# Patient Record
(demographics unavailable — no encounter records)

---

## 2025-05-20 NOTE — PROCEDURE
[Straight Catheterization] : insertion of a straight catheter [Urinary Tract Infection] : a urinary tract infection [Urgent Incontinence] : urgent incontinence [Patient] : the patient [___ Fr Straight Tip] : a [unfilled] in Polish straight tip catheter [None] : there were no complications with the catheter insertion [Clear] : clear [No Complications] : no complications [Tolerated Well] : the patient tolerated the procedure well [Post procedure instructions and information given] : Post procedure instructions and information were given and reviewed with patient. [1] : 1 [FreeTextEntry1] : cathed to obtain pvr and uncontam specimen

## 2025-05-20 NOTE — PHYSICAL EXAM
[MA] : MA [FreeTextEntry2] : Maryann [No Acute Distress] : in no acute distress [Oriented x3] : oriented to person, place, and time [Tenderness] : ~T no ~M abdominal tenderness observed [Distended] : not distended [Soft, Nontender] : the abdomen was soft and nontender [None] : no CVA tenderness [Labia Majora] : were normal [Labia Minora] : were normal [Bartholin's Gland] : both Bartholin's glands were normal  [Normal Appearance] : general appearance was normal [No Bleeding] : there was no active vaginal bleeding [2] : 2 [Aa ____] : Aa [unfilled] [Ba ____] : Ba [unfilled] [C ____] : C [unfilled] [GH ____] : GH [unfilled] [PB ____] : PB [unfilled] [TVL ____] : TVL  [unfilled] [Ap ____] : Ap [unfilled] [Bp ____] : Bp [unfilled] [D ____] : D [unfilled] [] : 0 [Normal] : normal [Soft] :  the cervix was soft [Post Void Residual ____ml] : post void residual was [unfilled] ml [Exam Deferred] : was deferred [FreeTextEntry4] : no mass, cyst, lesion [de-identified] : no appreciable mass, nontender

## 2025-05-20 NOTE — ASSESSMENT
[FreeTextEntry1] : 58 yo  P2 with 6 months of reported rUTIs diagnosed and treated at [facility]; only 1 UCx in [facility] from 2/25 was neg. On exam, her PVR was normal, empty supine CST was neg, there was no POP on POPQ, and atrophic vaginal wall changes were noted. We discussed the diagnosis of a UTI vs recurrent UTIs with symptoms as well as +UCx. We discussed providing a urine specimen to be tested with UTI suspected, possibly via cath to avoid contamination. We discussed potential evaluation with renal tract imaging and cystourethroscopy to eval for path such as stone or malignancy. We dissused prevention strategies including daily vs post-coital low-dose abx, topical vaginal estrogen if postmenopausal, acidifying urine with cranberry or vit C, methenamine, probiotic use, d-mannose. Risks, benefits; side effects of the meds discussed. Vaginal estrogen side effects such as breast tenderness or vaginal spotting, rare risks such as MI / stroke / VTE / estrogen-dependent cancer discussed and she would like to try it. Rx sent.  Plan: [] CTU [] RTO for cystourethroscopy [] start vaginal estrogen for UTI prophylaxis

## 2025-05-20 NOTE — OB HISTORY
[Vaginal ___] : [unfilled] vaginal delivery(s) [Approximately ___ (Month)] : the LMP was approximately [unfilled] month(s) ago [Last Pap Smear ___] : date of last pap smear was on [unfilled] [Sexually Active] : sexually active [Abnormal Pap Smear] : normal pap smear [FreeTextEntry1] : male condoms used

## 2025-05-20 NOTE — HISTORY OF PRESENT ILLNESS
[FreeTextEntry1] : About 6 months of UTIs - about 3 times, reports symptoms include L sided sharp pain and dysuria. No gross hematuria. Treated with abx per [facility] which helps, but symptoms return as soon as abx complete. Reports does not believe UCxs sent from [facility] as she didn't get results 2-3 days after visits. Sexually active but not aware of that being a RF more than one time. No vaginal bleeding, bulge, pressure symptoms. Rare UUI not regular or bothersome, no JAMIE, nocturia 2-3 and some urge freq but not bothersome. Normal caliber BMs without seepage.  [facility] record review: 2/7/2025: AST//162, BUN/Cr 18/0.7, GFR 96, alk phos 187, hgba1c 7.2%, UA 2 rbcs/+epithel cells/+bacteria/+ca crystals 2/9/25: UCx neg  PMH DM, fatty liver, high cholesterol, BMI 21 PSH denied Non-smoker NKDA

## 2025-05-20 NOTE — REASON FOR VISIT
[Questionnaire Received] : Patient questionnaire received [Recurrent Urinary Infections] : recurrent urinary infections [Urinary Incontinence] : urinary incontinence [Urine Frequency] : urine frequency [Urinary Urgency] : urinary urgency [Nocturia] : nocturia [________] : [unfilled] [Cannot Empty Bladder] : cannot empty bladder